# Patient Record
Sex: FEMALE | Race: WHITE | Employment: FULL TIME | URBAN - METROPOLITAN AREA
[De-identification: names, ages, dates, MRNs, and addresses within clinical notes are randomized per-mention and may not be internally consistent; named-entity substitution may affect disease eponyms.]

---

## 2021-07-20 ENCOUNTER — OFFICE VISIT (OUTPATIENT)
Dept: PRIMARY CARE CLINIC | Age: 26
End: 2021-07-20
Payer: COMMERCIAL

## 2021-07-20 VITALS
OXYGEN SATURATION: 98 % | HEART RATE: 76 BPM | HEIGHT: 66 IN | BODY MASS INDEX: 23.75 KG/M2 | TEMPERATURE: 97.2 F | SYSTOLIC BLOOD PRESSURE: 118 MMHG | DIASTOLIC BLOOD PRESSURE: 70 MMHG | WEIGHT: 147.8 LBS

## 2021-07-20 DIAGNOSIS — R41.840 POOR CONCENTRATION: Primary | ICD-10-CM

## 2021-07-20 PROCEDURE — 99202 OFFICE O/P NEW SF 15 MIN: CPT | Performed by: FAMILY MEDICINE

## 2021-07-20 RX ORDER — ADAPALENE 3 MG/G
GEL TOPICAL
COMMUNITY
Start: 2021-04-16

## 2021-07-20 SDOH — ECONOMIC STABILITY: TRANSPORTATION INSECURITY
IN THE PAST 12 MONTHS, HAS LACK OF TRANSPORTATION KEPT YOU FROM MEETINGS, WORK, OR FROM GETTING THINGS NEEDED FOR DAILY LIVING?: NO

## 2021-07-20 SDOH — ECONOMIC STABILITY: TRANSPORTATION INSECURITY
IN THE PAST 12 MONTHS, HAS THE LACK OF TRANSPORTATION KEPT YOU FROM MEDICAL APPOINTMENTS OR FROM GETTING MEDICATIONS?: NO

## 2021-07-20 SDOH — ECONOMIC STABILITY: FOOD INSECURITY: WITHIN THE PAST 12 MONTHS, YOU WORRIED THAT YOUR FOOD WOULD RUN OUT BEFORE YOU GOT MONEY TO BUY MORE.: NEVER TRUE

## 2021-07-20 SDOH — ECONOMIC STABILITY: FOOD INSECURITY: WITHIN THE PAST 12 MONTHS, THE FOOD YOU BOUGHT JUST DIDN'T LAST AND YOU DIDN'T HAVE MONEY TO GET MORE.: NEVER TRUE

## 2021-07-20 ASSESSMENT — PATIENT HEALTH QUESTIONNAIRE - PHQ9
2. FEELING DOWN, DEPRESSED OR HOPELESS: 0
SUM OF ALL RESPONSES TO PHQ QUESTIONS 1-9: 0
SUM OF ALL RESPONSES TO PHQ QUESTIONS 1-9: 0
SUM OF ALL RESPONSES TO PHQ9 QUESTIONS 1 & 2: 0
1. LITTLE INTEREST OR PLEASURE IN DOING THINGS: 0
SUM OF ALL RESPONSES TO PHQ QUESTIONS 1-9: 0

## 2021-07-20 ASSESSMENT — SOCIAL DETERMINANTS OF HEALTH (SDOH): HOW HARD IS IT FOR YOU TO PAY FOR THE VERY BASICS LIKE FOOD, HOUSING, MEDICAL CARE, AND HEATING?: NOT HARD AT ALL

## 2021-07-20 NOTE — PROGRESS NOTES
PROGRESS NOTE  Date of Service:  7/20/2021    Chief Complaint   Patient presents with   Chelsi Mcrae Doctor     Referral for ADHD medication         SUBJECTIVE:  Patient ID: Sonja Castle is a 27 y.o. female here as a new patient    HPI:   Patient is 63-year-old female with no major medical problem here for referral for evaluation of possible ADD. Patient describes poor concentration and easily distracted. Recalled in grade school has problems finishing tasks and concentrating, fidgety but never held back. The same in high school but never been evaluated for ADD. She finished college in 2019 and started working as a . She has no problem concentrating working and believes she is productive. Can finish the project on time. No family history of ADD. Seen Dr. Jose Vega, gyne, had pap smear 08/2020    History reviewed. No pertinent past medical history. History reviewed. No pertinent surgical history. Social History     Tobacco Use    Smoking status: Never Smoker    Smokeless tobacco: Never Used   Substance Use Topics    Alcohol use: Yes     Comment: occasionally       Family History   Problem Relation Age of Onset    Other Mother         crohns    High Cholesterol Father      Current Outpatient Medications on File Prior to Visit   Medication Sig Dispense Refill    Adapalene 0.3 % GEL APPLY TOPICALLY TO THE AFFECTED AREA AT BEDTIME       No current facility-administered medications on file prior to visit. No Known Allergies     Review of Systems    OBJECTIVE:  /70 (Site: Left Upper Arm, Position: Sitting, Cuff Size: Medium Adult)   Pulse 76   Temp 97.2 °F (36.2 °C) (Infrared)   Ht 5' 6\" (1.676 m)   Wt 147 lb 12.8 oz (67 kg)   LMP 06/15/2021 (Exact Date)   SpO2 98%   BMI 23.86 kg/m²    Physical Exam  Patient alert oriented x3, ambulatory, slightly anxious but pleasant    ASSESSMENT:  1. Poor concentration         PLAN:   1.  Poor concentration  Discussed possible anxiety rather than ADD. Will refer for psychiatric evaluation to get a definitive diagnosis and managed appropriately. - Ambulatory referral to Psychiatry  -Discussed health maintenance recommendations. Plan to follow-up in 3 months for annual physical/fasting blood test.    Return in about 3 months (around 10/20/2021). Electronically signed by Bernice Guzman MD on 7/20/21 at 8:24 AM.     This dictation was generated by voice recognition computer software. Although all attempts are made to edit the dictation for accuracy, there may be errors in the transcription that are not intended.